# Patient Record
Sex: MALE | Race: WHITE | NOT HISPANIC OR LATINO | URBAN - NONMETROPOLITAN AREA
[De-identification: names, ages, dates, MRNs, and addresses within clinical notes are randomized per-mention and may not be internally consistent; named-entity substitution may affect disease eponyms.]

---

## 2022-06-02 PROBLEM — Z93.3 COLOSTOMY IN PLACE (CMS/HCC): Chronic | Status: CHRONIC | Noted: 2022-06-02

## 2022-06-02 PROBLEM — D50.0 IRON DEFICIENCY ANEMIA DUE TO CHRONIC BLOOD LOSS: Chronic | Status: CHRONIC | Noted: 2022-06-02

## 2022-06-02 PROBLEM — K50.913: Chronic | Status: CHRONIC | Noted: 2022-06-02

## 2023-04-27 ENCOUNTER — OFFICE (OUTPATIENT)
Dept: URBAN - NONMETROPOLITAN AREA CLINIC 1 | Facility: CLINIC | Age: 53
End: 2023-04-27

## 2023-04-27 VITALS
WEIGHT: 151 LBS | SYSTOLIC BLOOD PRESSURE: 138 MMHG | HEIGHT: 72 IN | DIASTOLIC BLOOD PRESSURE: 90 MMHG | HEART RATE: 67 BPM

## 2023-04-27 DIAGNOSIS — K50.90 CROHN'S DISEASE, UNSPECIFIED, WITHOUT COMPLICATIONS: ICD-10-CM

## 2023-04-27 DIAGNOSIS — Z93.3 COLOSTOMY STATUS: ICD-10-CM

## 2023-04-27 DIAGNOSIS — K21.9 GASTRO-ESOPHAGEAL REFLUX DISEASE WITHOUT ESOPHAGITIS: ICD-10-CM

## 2023-04-27 DIAGNOSIS — K22.70 BARRETT'S ESOPHAGUS WITHOUT DYSPLASIA: ICD-10-CM

## 2023-04-27 DIAGNOSIS — K60.5 ANORECTAL FISTULA: ICD-10-CM

## 2023-04-27 DIAGNOSIS — I86.8 VARICOSE VEINS OF OTHER SPECIFIED SITES: ICD-10-CM

## 2023-04-27 PROCEDURE — 99213 OFFICE O/P EST LOW 20 MIN: CPT | Performed by: INTERNAL MEDICINE

## 2023-09-21 ENCOUNTER — OFFICE (OUTPATIENT)
Dept: URBAN - NONMETROPOLITAN AREA CLINIC 1 | Facility: CLINIC | Age: 53
End: 2023-09-21

## 2023-09-21 VITALS
HEART RATE: 74 BPM | HEIGHT: 72 IN | DIASTOLIC BLOOD PRESSURE: 92 MMHG | SYSTOLIC BLOOD PRESSURE: 152 MMHG | WEIGHT: 146 LBS

## 2023-09-21 DIAGNOSIS — Z93.3 COLOSTOMY STATUS: ICD-10-CM

## 2023-09-21 DIAGNOSIS — K21.9 GASTRO-ESOPHAGEAL REFLUX DISEASE WITHOUT ESOPHAGITIS: ICD-10-CM

## 2023-09-21 DIAGNOSIS — K60.5 ANORECTAL FISTULA: ICD-10-CM

## 2023-09-21 PROCEDURE — 99213 OFFICE O/P EST LOW 20 MIN: CPT | Performed by: INTERNAL MEDICINE

## 2023-11-16 ENCOUNTER — OFFICE (OUTPATIENT)
Dept: URBAN - NONMETROPOLITAN AREA CLINIC 1 | Facility: CLINIC | Age: 53
End: 2023-11-16

## 2023-11-16 DIAGNOSIS — K50.90 CROHN'S DISEASE, UNSPECIFIED, WITHOUT COMPLICATIONS: ICD-10-CM

## 2023-11-16 PROCEDURE — 96365 THER/PROPH/DIAG IV INF INIT: CPT | Performed by: INTERNAL MEDICINE

## 2023-12-06 ENCOUNTER — OFFICE (OUTPATIENT)
Dept: URBAN - NONMETROPOLITAN AREA CLINIC 1 | Facility: CLINIC | Age: 53
End: 2023-12-06

## 2023-12-06 VITALS
WEIGHT: 150 LBS | DIASTOLIC BLOOD PRESSURE: 104 MMHG | SYSTOLIC BLOOD PRESSURE: 152 MMHG | HEIGHT: 72 IN | DIASTOLIC BLOOD PRESSURE: 100 MMHG | HEART RATE: 58 BPM

## 2023-12-06 DIAGNOSIS — I10 ESSENTIAL (PRIMARY) HYPERTENSION: ICD-10-CM

## 2023-12-06 DIAGNOSIS — K22.70 BARRETT'S ESOPHAGUS WITHOUT DYSPLASIA: ICD-10-CM

## 2023-12-06 DIAGNOSIS — K50.90 CROHN'S DISEASE, UNSPECIFIED, WITHOUT COMPLICATIONS: ICD-10-CM

## 2023-12-06 DIAGNOSIS — K21.9 GASTRO-ESOPHAGEAL REFLUX DISEASE WITHOUT ESOPHAGITIS: ICD-10-CM

## 2023-12-06 DIAGNOSIS — Z93.3 COLOSTOMY STATUS: ICD-10-CM

## 2023-12-06 DIAGNOSIS — K60.5 ANORECTAL FISTULA: ICD-10-CM

## 2023-12-06 PROCEDURE — 99213 OFFICE O/P EST LOW 20 MIN: CPT | Performed by: INTERNAL MEDICINE

## 2024-01-11 ENCOUNTER — OFFICE (OUTPATIENT)
Dept: URBAN - NONMETROPOLITAN AREA CLINIC 1 | Facility: CLINIC | Age: 54
End: 2024-01-11

## 2024-01-11 VITALS
RESPIRATION RATE: 12 BRPM | DIASTOLIC BLOOD PRESSURE: 97 MMHG | TEMPERATURE: 97.6 F | SYSTOLIC BLOOD PRESSURE: 155 MMHG | HEART RATE: 60 BPM | OXYGEN SATURATION: 97 % | WEIGHT: 153.8 LBS | TEMPERATURE: 97.5 F | HEART RATE: 57 BPM | DIASTOLIC BLOOD PRESSURE: 86 MMHG | OXYGEN SATURATION: 100 % | HEIGHT: 72 IN | SYSTOLIC BLOOD PRESSURE: 131 MMHG

## 2024-01-11 DIAGNOSIS — K50.90 CROHN'S DISEASE, UNSPECIFIED, WITHOUT COMPLICATIONS: ICD-10-CM

## 2024-01-11 PROCEDURE — 96375 TX/PRO/DX INJ NEW DRUG ADDON: CPT | Mod: 59 | Performed by: INTERNAL MEDICINE

## 2024-01-11 PROCEDURE — 96365 THER/PROPH/DIAG IV INF INIT: CPT | Performed by: INTERNAL MEDICINE

## 2024-01-11 NOTE — SERVICENOTES
Next [facility] scheduled Thur. March 7, 2024, at 8:30am.
6 Month Follow Up with Dr. Schmidt scheduled May 22, 2024, at 8:15am.
Appointment reminder card provided.

## 2024-01-11 NOTE — SERVICEHPINOTES
Please see Follow Up Note dated 12/6/2023. brMost recent labs drawn 11/16/2023, including CBC w/Diff, CMP, CRP, and Quantiferon (-). brInsurance: No PA required.brPAPbrInfusion order dated 9/21/2023.brUpon arrival patient denies any recent changes to his insurance.brNo recent hospitalizations or surgical procedures, including dental. None upcoming.brPatient denies any recent signs and symptoms of infection including rash and fever. No recent antibiotic use noted. brPatient has colostomy, notes diarrhea and fecal urgency. Denies cramping, and blood in stool at this time.brDenies pain, nausea, and vomiting. brConsent on file dated 11/16/2023.brTimeout performed.

## 2024-03-07 ENCOUNTER — OFFICE (OUTPATIENT)
Dept: URBAN - NONMETROPOLITAN AREA CLINIC 1 | Facility: CLINIC | Age: 54
End: 2024-03-07

## 2024-03-07 VITALS
SYSTOLIC BLOOD PRESSURE: 150 MMHG | HEART RATE: 54 BPM | TEMPERATURE: 97.4 F | DIASTOLIC BLOOD PRESSURE: 92 MMHG | SYSTOLIC BLOOD PRESSURE: 134 MMHG | DIASTOLIC BLOOD PRESSURE: 81 MMHG | HEART RATE: 59 BPM | RESPIRATION RATE: 12 BRPM | HEIGHT: 72 IN | TEMPERATURE: 97.2 F | OXYGEN SATURATION: 99 % | WEIGHT: 154 LBS

## 2024-03-07 DIAGNOSIS — K50.90 CROHN'S DISEASE, UNSPECIFIED, WITHOUT COMPLICATIONS: ICD-10-CM

## 2024-03-07 PROCEDURE — 96375 TX/PRO/DX INJ NEW DRUG ADDON: CPT | Mod: 59 | Performed by: INTERNAL MEDICINE

## 2024-03-07 PROCEDURE — 96365 THER/PROPH/DIAG IV INF INIT: CPT | Performed by: INTERNAL MEDICINE

## 2024-03-07 NOTE — SERVICENOTES
Next [facility] scheduled Thur. May 2, 2024, at 8:30am.
Follow Up appointment with Dr. Schmidt scheduled May 22, 2024, at 8:15am.

## 2024-03-07 NOTE — SERVICEHPINOTES
Please see Follow Up Note dated 12/6/2023. brMost recent labs drawn 11/16/2023, including CBC w/Diff, CMP, CRP, and Quantiferon (-). brInsurance: No PA required.brPAPbrInfusion order dated 9/21/2023.brUpon arrival patient denies any recent changes to his insurance.brNo recent hospitalizations or surgical procedures, including dental. None upcoming.brPatient denies any recent signs and symptoms of infection including rash and fever. No recent antibiotic use noted. brPatient has colostomy, notes diarrhea. Denies fecal urgency, cramping, and blood in stool at this time.
br Reports generalized joint pain.brDenies nausea, and vomiting. brConsent on file dated 11/16/2023.brTimeout performed.

## 2024-05-02 ENCOUNTER — OFFICE (OUTPATIENT)
Dept: URBAN - NONMETROPOLITAN AREA CLINIC 1 | Facility: CLINIC | Age: 54
End: 2024-05-02
Payer: MEDICARE

## 2024-05-02 VITALS
DIASTOLIC BLOOD PRESSURE: 93 MMHG | WEIGHT: 152.2 LBS | HEIGHT: 72 IN | DIASTOLIC BLOOD PRESSURE: 74 MMHG | SYSTOLIC BLOOD PRESSURE: 126 MMHG | SYSTOLIC BLOOD PRESSURE: 138 MMHG | HEART RATE: 55 BPM | HEART RATE: 56 BPM | HEART RATE: 58 BPM | DIASTOLIC BLOOD PRESSURE: 80 MMHG | TEMPERATURE: 97.7 F | DIASTOLIC BLOOD PRESSURE: 86 MMHG | SYSTOLIC BLOOD PRESSURE: 142 MMHG | TEMPERATURE: 97.6 F | SYSTOLIC BLOOD PRESSURE: 141 MMHG | OXYGEN SATURATION: 100 % | HEART RATE: 57 BPM | RESPIRATION RATE: 12 BRPM

## 2024-05-02 DIAGNOSIS — K50.90 CROHN'S DISEASE, UNSPECIFIED, WITHOUT COMPLICATIONS: ICD-10-CM

## 2024-05-02 PROCEDURE — 96375 TX/PRO/DX INJ NEW DRUG ADDON: CPT | Mod: 59 | Performed by: INTERNAL MEDICINE

## 2024-05-02 PROCEDURE — 96365 THER/PROPH/DIAG IV INF INIT: CPT | Performed by: INTERNAL MEDICINE

## 2024-05-02 NOTE — SERVICENOTES
CBC w/ Diff, CMP, and CRP drawn 5/2/2024, prior to infusion.
Next [facility] scheduled Thursday, June 27, 2024, at 12:00pm.

## 2024-05-02 NOTE — SERVICEHPINOTES
Please see Follow Up Note dated 12/6/2023. brMost recent labs drawn 11/16/2023, including CBC w/Diff, CMP, CRP, and Quantiferon (-). brInsurance: No PA required.brPAPbrInfusion order dated 9/21/2023.brUpon arrival patient denies any recent changes to his insurance.brNo recent hospitalizations or surgical procedures, including dental. None upcoming.brPatient denies any recent signs and symptoms of infection including rash and fever. No recent antibiotic use noted. brPatient has colostomy, notes diarrhea. Denies fecal urgency, cramping, and blood in stool at this time.brReports generalized joint pain.brDenies nausea, and vomiting. brConsent on file dated 11/16/2023.brTimeout performed.

## 2024-06-27 ENCOUNTER — OFFICE (OUTPATIENT)
Dept: URBAN - NONMETROPOLITAN AREA CLINIC 1 | Facility: CLINIC | Age: 54
End: 2024-06-27

## 2024-06-27 VITALS
OXYGEN SATURATION: 100 % | HEART RATE: 66 BPM | SYSTOLIC BLOOD PRESSURE: 136 MMHG | TEMPERATURE: 97.7 F | DIASTOLIC BLOOD PRESSURE: 80 MMHG | OXYGEN SATURATION: 99 % | SYSTOLIC BLOOD PRESSURE: 128 MMHG | DIASTOLIC BLOOD PRESSURE: 84 MMHG | HEART RATE: 55 BPM | HEART RATE: 60 BPM | SYSTOLIC BLOOD PRESSURE: 138 MMHG | RESPIRATION RATE: 12 BRPM | WEIGHT: 145.8 LBS | DIASTOLIC BLOOD PRESSURE: 81 MMHG | TEMPERATURE: 97.5 F | HEART RATE: 56 BPM | SYSTOLIC BLOOD PRESSURE: 127 MMHG | HEIGHT: 72 IN

## 2024-06-27 DIAGNOSIS — K50.90 CROHN'S DISEASE, UNSPECIFIED, WITHOUT COMPLICATIONS: ICD-10-CM

## 2024-06-27 PROCEDURE — 96375 TX/PRO/DX INJ NEW DRUG ADDON: CPT | Mod: 59 | Performed by: INTERNAL MEDICINE

## 2024-06-27 PROCEDURE — 96365 THER/PROPH/DIAG IV INF INIT: CPT | Performed by: INTERNAL MEDICINE

## 2024-06-27 NOTE — SERVICEHPINOTES
Please see Follow Up Note dated 12/6/2023. brMost recent labs drawn 5/2/2024, including CBC w/Diff, CMP, and CRP. Quantiferon (-) 11/16/2023. brInsurance: No PA required.brPAPbrInfusion order dated 9/21/2023.brUpon arrival for infusion patient denies any recent changes to his insurance.brNo recent hospitalizations or surgical procedures, including dental. None upcoming.brPatient denies any recent signs and symptoms of infection including rash and fever. No recent antibiotic use noted. brPatient has colostomy, notes diarrhea and fecal urgency. Denies cramping and blood in stool at this time.brReports generalized joint pain.brDenies nausea, and vomiting. brConsent on file dated 11/16/2023.brTimeout performed.

## 2025-04-17 ENCOUNTER — OFFICE (OUTPATIENT)
Dept: URBAN - NONMETROPOLITAN AREA CLINIC 1 | Facility: CLINIC | Age: 55
End: 2025-04-17
Payer: MEDICARE

## 2025-04-17 DIAGNOSIS — K50.00 CROHN'S DISEASE OF SMALL INTESTINE WITHOUT COMPLICATIONS: ICD-10-CM

## 2025-04-17 PROCEDURE — 96375 TX/PRO/DX INJ NEW DRUG ADDON: CPT | Mod: 59 | Performed by: INTERNAL MEDICINE

## 2025-04-17 PROCEDURE — 96365 THER/PROPH/DIAG IV INF INIT: CPT | Performed by: INTERNAL MEDICINE

## 2025-06-03 ENCOUNTER — OFFICE (OUTPATIENT)
Dept: URBAN - NONMETROPOLITAN AREA CLINIC 1 | Facility: CLINIC | Age: 55
End: 2025-06-03
Payer: MEDICARE

## 2025-06-03 DIAGNOSIS — K50.90 CROHN'S DISEASE, UNSPECIFIED, WITHOUT COMPLICATIONS: ICD-10-CM

## 2025-06-03 PROCEDURE — 96365 THER/PROPH/DIAG IV INF INIT: CPT | Performed by: INTERNAL MEDICINE

## 2025-06-03 PROCEDURE — 96375 TX/PRO/DX INJ NEW DRUG ADDON: CPT | Mod: 59 | Performed by: INTERNAL MEDICINE
